# Patient Record
Sex: MALE | Race: OTHER
[De-identification: names, ages, dates, MRNs, and addresses within clinical notes are randomized per-mention and may not be internally consistent; named-entity substitution may affect disease eponyms.]

---

## 2019-04-08 ENCOUNTER — HOSPITAL ENCOUNTER (EMERGENCY)
Dept: HOSPITAL 8 - ED | Age: 54
Discharge: HOME | End: 2019-04-08
Payer: MEDICAID

## 2019-04-08 VITALS — HEIGHT: 65 IN | BODY MASS INDEX: 26.81 KG/M2 | WEIGHT: 160.94 LBS

## 2019-04-08 VITALS — SYSTOLIC BLOOD PRESSURE: 147 MMHG | DIASTOLIC BLOOD PRESSURE: 106 MMHG

## 2019-04-08 DIAGNOSIS — K08.89: Primary | ICD-10-CM

## 2019-04-08 DIAGNOSIS — K02.9: ICD-10-CM

## 2019-04-08 PROCEDURE — 99283 EMERGENCY DEPT VISIT LOW MDM: CPT

## 2019-12-10 ENCOUNTER — HOSPITAL ENCOUNTER (EMERGENCY)
Dept: HOSPITAL 8 - ED | Age: 54
Discharge: HOME | End: 2019-12-10
Payer: MEDICAID

## 2019-12-10 VITALS — BODY MASS INDEX: 24.66 KG/M2 | HEIGHT: 66 IN | WEIGHT: 153.44 LBS

## 2019-12-10 VITALS — DIASTOLIC BLOOD PRESSURE: 82 MMHG | SYSTOLIC BLOOD PRESSURE: 122 MMHG

## 2019-12-10 DIAGNOSIS — J20.8: Primary | ICD-10-CM

## 2019-12-10 DIAGNOSIS — B34.9: ICD-10-CM

## 2019-12-10 PROCEDURE — 99283 EMERGENCY DEPT VISIT LOW MDM: CPT

## 2019-12-10 PROCEDURE — 71046 X-RAY EXAM CHEST 2 VIEWS: CPT

## 2019-12-10 NOTE — NUR
PT TO ED FOR "BRONCHITIS" (COUGH WITH YELLOW PHLEGM) X3 DAYS AND LEFT HIP PAIN 
X WEEKS. PT REPORTS PREVIOUD MD STATED HE NEEDS A REPLACEMENT. PT CONNECTED TO 
ALL MONITORS. VSS. DR. MEADOWS TO BS FOR ASSESSMENT. AWAITING ORDERS.

## 2020-10-04 ENCOUNTER — HOSPITAL ENCOUNTER (EMERGENCY)
Dept: HOSPITAL 8 - ED | Age: 55
Discharge: HOME | End: 2020-10-04
Payer: MEDICAID

## 2020-10-04 VITALS — BODY MASS INDEX: 25.19 KG/M2 | HEIGHT: 66 IN | WEIGHT: 156.75 LBS

## 2020-10-04 VITALS — SYSTOLIC BLOOD PRESSURE: 155 MMHG | DIASTOLIC BLOOD PRESSURE: 95 MMHG

## 2020-10-04 DIAGNOSIS — S02.19XA: Primary | ICD-10-CM
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
## 2020-10-04 DIAGNOSIS — S09.90XA: ICD-10-CM
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
## 2020-10-04 DIAGNOSIS — Z88.0: ICD-10-CM
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
## 2020-10-04 PROCEDURE — 70450 CT HEAD/BRAIN W/O DYE: CPT

## 2020-10-04 PROCEDURE — 70480 CT ORBIT/EAR/FOSSA W/O DYE: CPT

## 2020-10-04 PROCEDURE — 99285 EMERGENCY DEPT VISIT HI MDM: CPT

## 2020-10-04 NOTE — NUR
pt resting in [facility]. pt's aox4. resps even and unlabored. bp/spo2 monitors in 
place. call light within reach.
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
